# Patient Record
Sex: MALE | Race: WHITE | NOT HISPANIC OR LATINO | Employment: FULL TIME | URBAN - METROPOLITAN AREA
[De-identification: names, ages, dates, MRNs, and addresses within clinical notes are randomized per-mention and may not be internally consistent; named-entity substitution may affect disease eponyms.]

---

## 2023-06-22 ENCOUNTER — APPOINTMENT (EMERGENCY)
Dept: RADIOLOGY | Facility: HOSPITAL | Age: 43
DRG: 442 | End: 2023-06-22
Payer: OTHER MISCELLANEOUS

## 2023-06-22 ENCOUNTER — ANESTHESIA (EMERGENCY)
Dept: PERIOP | Facility: HOSPITAL | Age: 43
DRG: 442 | End: 2023-06-22
Payer: OTHER MISCELLANEOUS

## 2023-06-22 ENCOUNTER — HOSPITAL ENCOUNTER (INPATIENT)
Facility: HOSPITAL | Age: 43
LOS: 1 days | Discharge: ANOTHER HOSPITAL | DRG: 442 | End: 2023-06-22
Attending: SURGERY | Admitting: SURGERY
Payer: OTHER MISCELLANEOUS

## 2023-06-22 ENCOUNTER — APPOINTMENT (EMERGENCY)
Dept: CT IMAGING | Facility: HOSPITAL | Age: 43
DRG: 442 | End: 2023-06-22
Payer: OTHER MISCELLANEOUS

## 2023-06-22 ENCOUNTER — ANESTHESIA EVENT (EMERGENCY)
Dept: PERIOP | Facility: HOSPITAL | Age: 43
DRG: 442 | End: 2023-06-22
Payer: OTHER MISCELLANEOUS

## 2023-06-22 VITALS
WEIGHT: 166.89 LBS | OXYGEN SATURATION: 100 % | DIASTOLIC BLOOD PRESSURE: 85 MMHG | TEMPERATURE: 98 F | RESPIRATION RATE: 16 BRPM | SYSTOLIC BLOOD PRESSURE: 131 MMHG | HEART RATE: 101 BPM

## 2023-06-22 DIAGNOSIS — S11.91XA COMPLEX LACERATION OF NECK: Primary | ICD-10-CM

## 2023-06-22 DIAGNOSIS — S01.83XA: ICD-10-CM

## 2023-06-22 PROBLEM — S05.92XA LEFT EYE INJURY: Status: RESOLVED | Noted: 2023-06-22 | Resolved: 2023-06-22

## 2023-06-22 PROBLEM — S05.92XA LEFT EYE INJURY: Status: ACTIVE | Noted: 2023-06-22

## 2023-06-22 PROBLEM — S11.93XA PENETRATING TRAUMATIC INJURY OF NECK: Status: ACTIVE | Noted: 2023-06-22

## 2023-06-22 PROBLEM — S05.32XA: Status: ACTIVE | Noted: 2023-06-22

## 2023-06-22 PROBLEM — S01.81XA LACERATION OF FACE: Status: ACTIVE | Noted: 2023-06-22

## 2023-06-22 PROBLEM — S01.81XA LACERATION OF FACE: Status: RESOLVED | Noted: 2023-06-22 | Resolved: 2023-06-22

## 2023-06-22 LAB
ABO GROUP BLD: NORMAL
ANION GAP SERPL CALCULATED.3IONS-SCNC: 7 MMOL/L
ANISOCYTOSIS BLD QL SMEAR: PRESENT
APTT PPP: 24 SECONDS (ref 23–37)
BASE EXCESS BLDA CALC-SCNC: -1.4 MMOL/L
BASOPHILS # BLD MANUAL: 0 THOUSAND/UL (ref 0–0.1)
BASOPHILS NFR MAR MANUAL: 0 % (ref 0–1)
BLD GP AB SCN SERPL QL: POSITIVE
BLOOD GROUP ANTIBODIES SERPL: NORMAL
BUN SERPL-MCNC: 17 MG/DL (ref 5–25)
CALCIUM SERPL-MCNC: 7.7 MG/DL (ref 8.4–10.2)
CHLORIDE SERPL-SCNC: 108 MMOL/L (ref 96–108)
CO2 SERPL-SCNC: 24 MMOL/L (ref 21–32)
CREAT SERPL-MCNC: 0.73 MG/DL (ref 0.6–1.3)
E AG RBC QL: NEGATIVE
EOSINOPHIL # BLD MANUAL: 0 THOUSAND/UL (ref 0–0.4)
EOSINOPHIL NFR BLD MANUAL: 0 % (ref 0–6)
ERYTHROCYTE [DISTWIDTH] IN BLOOD BY AUTOMATED COUNT: 12.1 % (ref 11.6–15.1)
GFR SERPL CREATININE-BSD FRML MDRD: 113 ML/MIN/1.73SQ M
GLUCOSE SERPL-MCNC: 135 MG/DL (ref 65–140)
HCO3 BLDA-SCNC: 22.6 MMOL/L (ref 22–28)
HCT VFR BLD AUTO: 32.7 % (ref 36.5–49.3)
HGB BLD-MCNC: 11 G/DL (ref 12–17)
HOLD SPECIMEN: NORMAL
INR PPP: 1.12 (ref 0.84–1.19)
LACTATE SERPL-SCNC: 0.8 MMOL/L (ref 0.5–2)
LYMPHOCYTES # BLD AUTO: 0.4 THOUSAND/UL (ref 0.6–4.47)
LYMPHOCYTES # BLD AUTO: 3 % (ref 14–44)
MCH RBC QN AUTO: 30.5 PG (ref 26.8–34.3)
MCHC RBC AUTO-ENTMCNC: 33.6 G/DL (ref 31.4–37.4)
MCV RBC AUTO: 91 FL (ref 82–98)
MONOCYTES # BLD AUTO: 0.4 THOUSAND/UL (ref 0–1.22)
MONOCYTES NFR BLD: 3 % (ref 4–12)
NEUTROPHILS # BLD MANUAL: 12.51 THOUSAND/UL (ref 1.85–7.62)
NEUTS BAND NFR BLD MANUAL: 3 % (ref 0–8)
NEUTS SEG NFR BLD AUTO: 91 % (ref 43–75)
O2 CT BLDA-SCNC: 17.1 ML/DL (ref 16–23)
OXYHGB MFR BLDA: 98.5 % (ref 94–97)
PCO2 BLDA: 35.5 MM HG (ref 36–44)
PH BLDA: 7.42 [PH] (ref 7.35–7.45)
PLATELET # BLD AUTO: 182 THOUSANDS/UL (ref 149–390)
PLATELET BLD QL SMEAR: ADEQUATE
PMV BLD AUTO: 9.5 FL (ref 8.9–12.7)
PO2 BLDA: 179.3 MM HG (ref 75–129)
POTASSIUM SERPL-SCNC: 3.8 MMOL/L (ref 3.5–5.3)
PROTHROMBIN TIME: 14.6 SECONDS (ref 11.6–14.5)
RBC # BLD AUTO: 3.61 MILLION/UL (ref 3.88–5.62)
RBC MORPH BLD: PRESENT
RH BLD: POSITIVE
SODIUM SERPL-SCNC: 139 MMOL/L (ref 135–147)
SPECIMEN EXPIRATION DATE: NORMAL
WBC # BLD AUTO: 13.31 THOUSAND/UL (ref 4.31–10.16)

## 2023-06-22 PROCEDURE — 85610 PROTHROMBIN TIME: CPT

## 2023-06-22 PROCEDURE — 99284 EMERGENCY DEPT VISIT MOD MDM: CPT

## 2023-06-22 PROCEDURE — 82330 ASSAY OF CALCIUM: CPT

## 2023-06-22 PROCEDURE — 86870 RBC ANTIBODY IDENTIFICATION: CPT | Performed by: SURGERY

## 2023-06-22 PROCEDURE — 70498 CT ANGIOGRAPHY NECK: CPT

## 2023-06-22 PROCEDURE — 99292 CRITICAL CARE ADDL 30 MIN: CPT | Performed by: SURGERY

## 2023-06-22 PROCEDURE — 70496 CT ANGIOGRAPHY HEAD: CPT

## 2023-06-22 PROCEDURE — 85730 THROMBOPLASTIN TIME PARTIAL: CPT

## 2023-06-22 PROCEDURE — 85007 BL SMEAR W/DIFF WBC COUNT: CPT

## 2023-06-22 PROCEDURE — 93308 TTE F-UP OR LMTD: CPT | Performed by: PHYSICIAN ASSISTANT

## 2023-06-22 PROCEDURE — 94150 VITAL CAPACITY TEST: CPT

## 2023-06-22 PROCEDURE — 36415 COLL VENOUS BLD VENIPUNCTURE: CPT | Performed by: SURGERY

## 2023-06-22 PROCEDURE — 94002 VENT MGMT INPAT INIT DAY: CPT

## 2023-06-22 PROCEDURE — 99291 CRITICAL CARE FIRST HOUR: CPT | Performed by: SURGERY

## 2023-06-22 PROCEDURE — 12015 RPR F/E/E/N/L/M 7.6-12.5 CM: CPT | Performed by: SURGERY

## 2023-06-22 PROCEDURE — 12001 RPR S/N/AX/GEN/TRNK 2.5CM/<: CPT | Performed by: SURGERY

## 2023-06-22 PROCEDURE — 40650 RPR LIP FTH VERMILION ONLY: CPT | Performed by: SURGERY

## 2023-06-22 PROCEDURE — 86850 RBC ANTIBODY SCREEN: CPT | Performed by: SURGERY

## 2023-06-22 PROCEDURE — 20100 EXPL PENTRG WOUND NECK: CPT | Performed by: SURGERY

## 2023-06-22 PROCEDURE — 76705 ECHO EXAM OF ABDOMEN: CPT | Performed by: PHYSICIAN ASSISTANT

## 2023-06-22 PROCEDURE — 82947 ASSAY GLUCOSE BLOOD QUANT: CPT

## 2023-06-22 PROCEDURE — 0JQ40ZZ REPAIR RIGHT NECK SUBCUTANEOUS TISSUE AND FASCIA, OPEN APPROACH: ICD-10-PCS | Performed by: SURGERY

## 2023-06-22 PROCEDURE — 0JQ50ZZ REPAIR LEFT NECK SUBCUTANEOUS TISSUE AND FASCIA, OPEN APPROACH: ICD-10-PCS | Performed by: SURGERY

## 2023-06-22 PROCEDURE — 85014 HEMATOCRIT: CPT

## 2023-06-22 PROCEDURE — NC001 PR NO CHARGE

## 2023-06-22 PROCEDURE — 71045 X-RAY EXAM CHEST 1 VIEW: CPT

## 2023-06-22 PROCEDURE — 82803 BLOOD GASES ANY COMBINATION: CPT

## 2023-06-22 PROCEDURE — 84295 ASSAY OF SERUM SODIUM: CPT

## 2023-06-22 PROCEDURE — 94760 N-INVAS EAR/PLS OXIMETRY 1: CPT

## 2023-06-22 PROCEDURE — 0HQ1XZZ REPAIR FACE SKIN, EXTERNAL APPROACH: ICD-10-PCS | Performed by: SURGERY

## 2023-06-22 PROCEDURE — 0JQ10ZZ REPAIR FACE SUBCUTANEOUS TISSUE AND FASCIA, OPEN APPROACH: ICD-10-PCS | Performed by: SURGERY

## 2023-06-22 PROCEDURE — 83605 ASSAY OF LACTIC ACID: CPT

## 2023-06-22 PROCEDURE — NC001 PR NO CHARGE: Performed by: SURGERY

## 2023-06-22 PROCEDURE — 86900 BLOOD TYPING SEROLOGIC ABO: CPT | Performed by: SURGERY

## 2023-06-22 PROCEDURE — 82805 BLOOD GASES W/O2 SATURATION: CPT

## 2023-06-22 PROCEDURE — 86905 BLOOD TYPING RBC ANTIGENS: CPT

## 2023-06-22 PROCEDURE — 76604 US EXAM CHEST: CPT | Performed by: PHYSICIAN ASSISTANT

## 2023-06-22 PROCEDURE — 03LY0ZZ OCCLUSION OF UPPER ARTERY, OPEN APPROACH: ICD-10-PCS | Performed by: SURGERY

## 2023-06-22 PROCEDURE — NC001 PR NO CHARGE: Performed by: PHYSICIAN ASSISTANT

## 2023-06-22 PROCEDURE — 86901 BLOOD TYPING SEROLOGIC RH(D): CPT | Performed by: SURGERY

## 2023-06-22 PROCEDURE — 85027 COMPLETE CBC AUTOMATED: CPT

## 2023-06-22 PROCEDURE — 84132 ASSAY OF SERUM POTASSIUM: CPT

## 2023-06-22 PROCEDURE — 80048 BASIC METABOLIC PNL TOTAL CA: CPT

## 2023-06-22 RX ORDER — FENTANYL CITRATE 50 UG/ML
INJECTION, SOLUTION INTRAMUSCULAR; INTRAVENOUS AS NEEDED
Status: DISCONTINUED | OUTPATIENT
Start: 2023-06-22 | End: 2023-06-22

## 2023-06-22 RX ORDER — HYDROMORPHONE HCL/PF 1 MG/ML
SYRINGE (ML) INJECTION AS NEEDED
Status: DISCONTINUED | OUTPATIENT
Start: 2023-06-22 | End: 2023-06-22

## 2023-06-22 RX ORDER — CEFAZOLIN SODIUM 1 G/3ML
INJECTION, POWDER, FOR SOLUTION INTRAMUSCULAR; INTRAVENOUS AS NEEDED
Status: DISCONTINUED | OUTPATIENT
Start: 2023-06-22 | End: 2023-06-22

## 2023-06-22 RX ORDER — SODIUM CHLORIDE 9 MG/ML
INJECTION, SOLUTION INTRAVENOUS CONTINUOUS PRN
Status: DISCONTINUED | OUTPATIENT
Start: 2023-06-22 | End: 2023-06-22

## 2023-06-22 RX ORDER — FENTANYL CITRATE-0.9 % NACL/PF 10 MCG/ML
125 PLASTIC BAG, INJECTION (ML) INTRAVENOUS CONTINUOUS
Status: DISCONTINUED | OUTPATIENT
Start: 2023-06-22 | End: 2023-06-22 | Stop reason: HOSPADM

## 2023-06-22 RX ORDER — DEXAMETHASONE SODIUM PHOSPHATE 10 MG/ML
INJECTION, SOLUTION INTRAMUSCULAR; INTRAVENOUS AS NEEDED
Status: DISCONTINUED | OUTPATIENT
Start: 2023-06-22 | End: 2023-06-22

## 2023-06-22 RX ORDER — MIDAZOLAM HYDROCHLORIDE 2 MG/2ML
2 INJECTION, SOLUTION INTRAMUSCULAR; INTRAVENOUS EVERY 4 HOURS PRN
Status: DISCONTINUED | OUTPATIENT
Start: 2023-06-22 | End: 2023-06-22 | Stop reason: HOSPADM

## 2023-06-22 RX ORDER — PROPOFOL 10 MG/ML
INJECTION, EMULSION INTRAVENOUS AS NEEDED
Status: DISCONTINUED | OUTPATIENT
Start: 2023-06-22 | End: 2023-06-22

## 2023-06-22 RX ORDER — SODIUM CHLORIDE, SODIUM GLUCONATE, SODIUM ACETATE, POTASSIUM CHLORIDE, MAGNESIUM CHLORIDE, SODIUM PHOSPHATE, DIBASIC, AND POTASSIUM PHOSPHATE .53; .5; .37; .037; .03; .012; .00082 G/100ML; G/100ML; G/100ML; G/100ML; G/100ML; G/100ML; G/100ML
125 INJECTION, SOLUTION INTRAVENOUS CONTINUOUS
Status: DISCONTINUED | OUTPATIENT
Start: 2023-06-22 | End: 2023-06-22 | Stop reason: HOSPADM

## 2023-06-22 RX ORDER — HYDROMORPHONE HYDROCHLORIDE 1 MG/ML
INJECTION, SOLUTION INTRAMUSCULAR; INTRAVENOUS; SUBCUTANEOUS AS NEEDED
Status: DISCONTINUED | OUTPATIENT
Start: 2023-06-22 | End: 2023-06-22

## 2023-06-22 RX ORDER — CHLORHEXIDINE GLUCONATE 0.12 MG/ML
15 RINSE ORAL EVERY 12 HOURS SCHEDULED
Status: DISCONTINUED | OUTPATIENT
Start: 2023-06-22 | End: 2023-06-22 | Stop reason: HOSPADM

## 2023-06-22 RX ORDER — SODIUM CHLORIDE, SODIUM LACTATE, POTASSIUM CHLORIDE, CALCIUM CHLORIDE 600; 310; 30; 20 MG/100ML; MG/100ML; MG/100ML; MG/100ML
INJECTION, SOLUTION INTRAVENOUS CONTINUOUS PRN
Status: DISCONTINUED | OUTPATIENT
Start: 2023-06-22 | End: 2023-06-22

## 2023-06-22 RX ORDER — SUCCINYLCHOLINE/SOD CL,ISO/PF 100 MG/5ML
SYRINGE (ML) INTRAVENOUS AS NEEDED
Status: DISCONTINUED | OUTPATIENT
Start: 2023-06-22 | End: 2023-06-22

## 2023-06-22 RX ORDER — FENTANYL CITRATE 50 UG/ML
50 INJECTION, SOLUTION INTRAMUSCULAR; INTRAVENOUS ONCE
Status: COMPLETED | OUTPATIENT
Start: 2023-06-22 | End: 2023-06-22

## 2023-06-22 RX ORDER — MIDAZOLAM HYDROCHLORIDE 2 MG/2ML
INJECTION, SOLUTION INTRAMUSCULAR; INTRAVENOUS AS NEEDED
Status: DISCONTINUED | OUTPATIENT
Start: 2023-06-22 | End: 2023-06-22

## 2023-06-22 RX ORDER — MAGNESIUM HYDROXIDE 1200 MG/15ML
LIQUID ORAL AS NEEDED
Status: DISCONTINUED | OUTPATIENT
Start: 2023-06-22 | End: 2023-06-22 | Stop reason: HOSPADM

## 2023-06-22 RX ORDER — ALBUMIN, HUMAN INJ 5% 5 %
SOLUTION INTRAVENOUS CONTINUOUS PRN
Status: DISCONTINUED | OUTPATIENT
Start: 2023-06-22 | End: 2023-06-22

## 2023-06-22 RX ORDER — ROCURONIUM BROMIDE 10 MG/ML
INJECTION, SOLUTION INTRAVENOUS AS NEEDED
Status: DISCONTINUED | OUTPATIENT
Start: 2023-06-22 | End: 2023-06-22

## 2023-06-22 RX ORDER — PROPOFOL 10 MG/ML
5-70 INJECTION, EMULSION INTRAVENOUS
Status: DISCONTINUED | OUTPATIENT
Start: 2023-06-22 | End: 2023-06-22 | Stop reason: HOSPADM

## 2023-06-22 RX ADMIN — FENTANYL CITRATE 25 MCG: 50 INJECTION INTRAMUSCULAR; INTRAVENOUS at 15:05

## 2023-06-22 RX ADMIN — CEFAZOLIN 1000 MG: 1 INJECTION, POWDER, FOR SOLUTION INTRAMUSCULAR; INTRAVENOUS at 11:48

## 2023-06-22 RX ADMIN — FENTANYL CITRATE 50 MCG: 50 INJECTION INTRAMUSCULAR; INTRAVENOUS at 12:11

## 2023-06-22 RX ADMIN — Medication 100 MG: at 11:46

## 2023-06-22 RX ADMIN — HYDROMORPHONE HYDROCHLORIDE 0.5 MG: 1 INJECTION, SOLUTION INTRAMUSCULAR; INTRAVENOUS; SUBCUTANEOUS at 13:54

## 2023-06-22 RX ADMIN — SODIUM CHLORIDE: 0.9 INJECTION, SOLUTION INTRAVENOUS at 11:47

## 2023-06-22 RX ADMIN — PROPOFOL 70 MCG/KG/MIN: 10 INJECTION, EMULSION INTRAVENOUS at 16:04

## 2023-06-22 RX ADMIN — FENTANYL CITRATE 50 MCG: 50 INJECTION INTRAMUSCULAR; INTRAVENOUS at 15:52

## 2023-06-22 RX ADMIN — Medication 125 MCG/HR: at 16:21

## 2023-06-22 RX ADMIN — ROCURONIUM BROMIDE 50 MG: 50 INJECTION INTRAVENOUS at 12:05

## 2023-06-22 RX ADMIN — ALBUMIN (HUMAN): 12.5 INJECTION, SOLUTION INTRAVENOUS at 12:34

## 2023-06-22 RX ADMIN — SODIUM CHLORIDE, SODIUM GLUCONATE, SODIUM ACETATE, POTASSIUM CHLORIDE, MAGNESIUM CHLORIDE, SODIUM PHOSPHATE, DIBASIC, AND POTASSIUM PHOSPHATE 125 ML/HR: .53; .5; .37; .037; .03; .012; .00082 INJECTION, SOLUTION INTRAVENOUS at 16:17

## 2023-06-22 RX ADMIN — ROCURONIUM BROMIDE 10 MG: 50 INJECTION INTRAVENOUS at 13:56

## 2023-06-22 RX ADMIN — MIDAZOLAM 2 MG: 1 INJECTION INTRAMUSCULAR; INTRAVENOUS at 15:54

## 2023-06-22 RX ADMIN — IOHEXOL 85 ML: 350 INJECTION, SOLUTION INTRAVENOUS at 11:23

## 2023-06-22 RX ADMIN — FENTANYL CITRATE 50 MCG: 50 INJECTION INTRAMUSCULAR; INTRAVENOUS at 11:45

## 2023-06-22 RX ADMIN — ROCURONIUM BROMIDE 30 MG: 50 INJECTION INTRAVENOUS at 13:26

## 2023-06-22 RX ADMIN — SODIUM CHLORIDE, SODIUM LACTATE, POTASSIUM CHLORIDE, AND CALCIUM CHLORIDE: .6; .31; .03; .02 INJECTION, SOLUTION INTRAVENOUS at 14:01

## 2023-06-22 RX ADMIN — PROPOFOL 200 MG: 10 INJECTION, EMULSION INTRAVENOUS at 11:46

## 2023-06-22 RX ADMIN — FENTANYL CITRATE 25 MCG: 50 INJECTION INTRAMUSCULAR; INTRAVENOUS at 15:10

## 2023-06-22 RX ADMIN — SODIUM CHLORIDE: 0.9 INJECTION, SOLUTION INTRAVENOUS at 13:53

## 2023-06-22 RX ADMIN — SODIUM CHLORIDE, SODIUM LACTATE, POTASSIUM CHLORIDE, AND CALCIUM CHLORIDE: .6; .31; .03; .02 INJECTION, SOLUTION INTRAVENOUS at 11:42

## 2023-06-22 RX ADMIN — DEXAMETHASONE SODIUM PHOSPHATE 10 MG: 10 INJECTION, SOLUTION INTRAMUSCULAR; INTRAVENOUS at 12:30

## 2023-06-22 RX ADMIN — CEFAZOLIN 1000 MG: 1 INJECTION, POWDER, FOR SOLUTION INTRAMUSCULAR; INTRAVENOUS at 11:53

## 2023-06-22 RX ADMIN — MIDAZOLAM HYDROCHLORIDE 2 MG: 1 INJECTION, SOLUTION INTRAMUSCULAR; INTRAVENOUS at 11:45

## 2023-06-22 RX ADMIN — FENTANYL CITRATE 50 MCG: 50 INJECTION INTRAMUSCULAR; INTRAVENOUS at 14:29

## 2023-06-22 RX ADMIN — ALBUMIN (HUMAN): 12.5 INJECTION, SOLUTION INTRAVENOUS at 13:25

## 2023-06-22 NOTE — ASSESSMENT & PLAN NOTE
- Penetrating injury to the anterior neck and left lateral neck from    - No definitive evidence of vascular, airway or oral digestive track during initial trauma evaluation and CT imaging  - OR for neck exploration and interventions as indicated  - Airway monitoring    - Patient likely to remain intubated postoperatively for transfer to outside hospital for further evaluation and management of left eye injury   - Local wound care as indicated  - Analgesia as needed  - Update tetanus vaccine status

## 2023-06-22 NOTE — ANESTHESIA PROCEDURE NOTES
Arterial Line Insertion    Performed by: Terry Maciel CRNA  Authorized by: Kartik Castanon MD  Consent: Verbal consent obtained  Written consent obtained  Risks and benefits: risks, benefits and alternatives were discussed  Consent given by: patient  Patient understanding: patient states understanding of the procedure being performed  Patient consent: the patient's understanding of the procedure matches consent given  Procedure consent: procedure consent matches procedure scheduled  Relevant documents: relevant documents present and verified  Test results: test results available and properly labeled  Patient identity confirmed: arm band and hospital-assigned identification number  Preparation: Patient was prepped and draped in the usual sterile fashion    Indications: hemodynamic monitoring  Orientation:  Right  Location: radial artery  Procedure Details:  Paco's test normal: yes  Needle gauge: 20  Number of attempts: 2    Post-procedure:  Post-procedure: dressing applied  Waveform: good waveform and waveform confirmed  Post-procedure CNS: normal  Patient tolerance: patient tolerated the procedure well with no immediate complications  Comments: Ultrasound guided

## 2023-06-22 NOTE — H&P
Trauma Alert: Level A   Model of Arrival: Ambulance    Trauma Team: Attending Carmella Hall and CHRISTINE Rosado  Consultants: {[facility] Trauma Consultants:[id number]}     History of Present Illness     Chief Complaint:  injury  Mechanism:Other:  injury     HPI:    Shaylee Alfredo is a 36 y o  male who [facility] after sustaining a fall off of a roof with a power washing injury  When patient slipped,  at 4000 PSI hit patient in the face and neck, causing injuries that were quickly bandaged prior to EMS arrival  On arrival to [place] [address], with blood  Lacerations to left eye and left lower eyelid, mandible laceration going up to upper lip, and laceration of left lower neck  Plan for CTA head and neck, OR exploration of left neck laceration, and possible transfer if left globe ruptured     Review of Systems   Constitutional: Negative  HENT: Negative  Eyes: Negative  Respiratory: Negative for shortness of breath  Cardiovascular: Negative for chest pain  Gastrointestinal: Negative for diarrhea, nausea and vomiting  Endocrine: Negative  Genitourinary: Negative  Musculoskeletal: Positive for neck pain  Left   Allergic/Immunologic: Negative  Neurological: Negative  Hematological: Negative  Psychiatric/Behavioral: Negative  12-point, complete review of systems was reviewed and negative except as stated above  Historical Information     No past medical history on file  No past surgical history on file  Unable to obtain history due to {Reasons why history is unobtainable:20919}         There is no immunization history on file for this patient  Last Tetanus: ***  Family History: Non-contributory     Meds/Allergies   all current active meds have been reviewed  Allergies have not been reviewed;   Not on File    Objective   Initial Vitals:   Temperature: 97 7 °F (36 5 °C) (06/22/23 1105)  Pulse: 77 (06/22/23 1105)  Respirations: 20 (06/22/23 1105)  Blood Pressure: 139/87 (06/22/23 1105)    Primary Survey:   Airway:        Status: patent;        Pre-hospital Interventions: none        Hospital Interventions: none  Breathing:        Pre-hospital Interventions: none       Effort: normal       Right breath sounds: normal       Left breath sounds: normal  Circulation:        Rhythm: regular       Rate: regular   Right Pulses Left Pulses      R femoral: 2+    R carotid: 2+     L femoral: 2+    L carotid: 2+     Disability:        GCS: Eye: 4; Verbal: 5 Motor: 6 Total: 15       Right Pupil:       Left Pupil:     R Motor Strength L Motor Strength    R : 5/5  R dorsiflex: 5/5  R plantarflex: 5/5 L : 5/5  L dorsiflex: 5/5  L plantarflex: 5/5        Sensory:  No sensory deficit  Exposure:           Secondary Survey:  Physical Exam  Vitals and nursing note reviewed  HENT:      Head:        Right Ear: External ear normal       Left Ear: External ear normal       Nose: Nose normal       Mouth/Throat:      Mouth: Mucous membranes are dry  Pharynx: Oropharynx is clear  Eyes:     Cardiovascular:      Rate and Rhythm: Normal rate and regular rhythm  Pulses: Normal pulses  Carotid pulses are 2+ on the right side and 2+ on the left side  Radial pulses are 2+ on the right side and 2+ on the left side  Heart sounds: S1 normal and S2 normal  No murmur heard  Pulmonary:      Effort: Pulmonary effort is normal  No respiratory distress  Breath sounds: Normal breath sounds  Abdominal:      General: Abdomen is flat  Bowel sounds are normal       Palpations: Abdomen is soft  Skin:     General: Skin is warm and dry  Capillary Refill: Capillary refill takes less than 2 seconds  Neurological:      Mental Status: He is alert and oriented to person, place, and time  GCS: GCS eye subscore is 4  GCS verbal subscore is 5  GCS motor subscore is 6         Invasive Devices     Peripheral Intravenous Line  Duration "Peripheral IV 06/22/23 Left Antecubital <1 day    Peripheral IV 06/22/23 Left;Dorsal (posterior) Hand <1 day              Lab Results: Results: I have personally reviewed all pertinent laboratory/tests results, BMP/CMP: No results found for: \"SODIUM\", \"K\", \"CL\", \"CO2\", \"ANIONGAP\", \"BUN\", \"CREATININE\", \"GLUCOSE\", \"CALCIUM\", \"AST\", \"ALT\", \"ALKPHOS\", \"PROT\", \"BILITOT\", \"EGFR\", CBC: No results found for: \"WBC\", \"HGB\", \"HCT\", \"MCV\", \"PLT\", \"ADJUSTEDWBC\", \"RBC\", \"MCH\", \"MCHC\", \"RDW\", \"MPV\", \"NRBC\", Coagulation: No results found for: \"PT\", \"INR\", \"APTT\", Lactate: No results found for: \"LACTATE\", Amylase: No results found for: \"AMYLASE\", Lipase: No results found for: \"LIPASE\", AST: No results found for: \"AST\", ALT: No results found for: \"ALT\", Urinalysis: No results found for: \"COLORU\", \"CLARITYU\", \"SPECGRAV\", \"PHUR\", \"LEUKOCYTESUR\", \"NITRITE\", \"PROTEINUA\", \"GLUCOSEU\", \"KETONESU\", \"BILIRUBINUR\", \"BLOODU\", CK: No results found for: \"CKTOTAL\", Troponin: No results found for: \"TROPONINI\", EtOH: No results found for: \"ETOH\", UDS: No components found for: \"RAPIDDRUGSCREEN\", ABG: No results found for: \"PHART\", \"DTC1DOO\", \"PO2ART\", \"NLB5HQR\", \"F6HGLPQZ\", \"BEART\", \"SOURCE\" and ISTAT: No components found for: \"VBG\"    Imaging Results: I have personally reviewed pertinent reports  Chest Xray(s): negative for acute findings   FAST exam(s): negative for acute findings   CT Scan(s): {Results:17019}   Additional Xray(s): N/A     Other Studies: ***     Code Status: No Order  Advance Directive and Living Will:      Power of :    POLST:    I have spent *** minutes with Patient and family today in which greater than 50% of this time was spent in counseling/coordination of care regarding Diagnostic results, Prognosis, Risks and benefits of tx options, Instructions for management and Risk factor reductions       "

## 2023-06-22 NOTE — ASSESSMENT & PLAN NOTE
- Laceration of the left eye with globe rupture evident on clinical evaluation as well as CT imaging   - Ophthalmology contacted and will apply eye shield with plan to transfer to [facility] for further work-up and management  - Patient with clear and serosanguineous fluid draining from left eye    - Patient without any visual acuity in the left eye on presentation

## 2023-06-22 NOTE — PROGRESS NOTES
I spoke with Dr Magdaleno Perez of the trauma service at [facility] who is the accepting physician for this transfer  The patient will be flown from [place] [address] to [facility] following his operative intervention and remain intubated for transfer  He will be transferred to an ICU bed  Additionally, I spoke with Dr Vira Pelaez of Ophthalmology to discuss his left eye injury and they were also agreeable to transfer for further evaluation and management  Currently, the transfer center is working on bed availability and transportation

## 2023-06-22 NOTE — ANESTHESIA POSTPROCEDURE EVALUATION
Post-Op Assessment Note    CV Status:  Stable  Pain scale: Unable to assess  Intubatad & sedated  Pain control: Unable to assess  Intubatad & sedated  Post-procedure mental status: Unable to assess  Intubatad & sedated  Hydration Status:  Hypovolemic   PONV Controlled:  Controlled   Airway Patency:  Patent  Airway: intubated      Post Op Vitals Reviewed: Yes      Staff: CRNA   Comments: Remained intubated & sedated  Tolerating current vent setttings  No notable events documented      ABP  115/69 (without pressors)    Temp      Pulse  98   Resp 18   SpO2 100% via ventilator

## 2023-06-22 NOTE — PROCEDURES
POC FAST     Date/Time: 6/22/2023 11:14 AM    Performed by: Jojo Chinchilla PA-C  Authorized by:  Jojo Chinchilla PA-C    Patient location:  Trauma  Procedure details:     Exam Type:  Diagnostic    Indications: blunt abdominal trauma and blunt chest trauma      Assess for:  Intra-abdominal fluid, pericardial effusion and pneumothorax    Technique: extended FAST      Views obtained:  Heart - Pericardial sac, LUQ - Splenorenal space, Suprapubic - Pouch of Asif, RUQ - Sutherland's Pouch, Left thorax and Right thorax    Image quality: diagnostic      Image availability:  Images available in PACS and video obtained  FAST Findings:     RUQ (Hepatorenal) free fluid: absent      LUQ (Splenorenal) free fluid: absent      Suprapubic free fluid: absent      Cardiac wall motion: identified      Pericardial effusion: absent    extended FAST (Pulmonary) findings:     Left lung sliding: Present      Right lung sliding: Present    Interpretation:     Impressions: negative

## 2023-06-22 NOTE — H&P
[facility]  [facility]  Name: Kemi Howard 36 y o  male I MRN: [id number]  Unit/Bed#: OR [place] I Date of Admission: 6/22/2023   Date of Service: 6/22/2023 I Hospital Day: 0      Assessment/Plan   * Penetrating traumatic injury of neck  Assessment & Plan  - Penetrating injury to the anterior neck and left lateral neck from    - No definitive evidence of vascular, airway or oral digestive track during initial trauma evaluation and CT imaging  - OR for neck exploration and interventions as indicated  - Airway monitoring    - Patient likely to remain intubated postoperatively for transfer to outside hospital for further evaluation and management of left eye injury   - Local wound care as indicated  - Analgesia as needed  - Update tetanus vaccine status  Complex laceration of neck-resolved as of 6/22/2023  Assessment & Plan  - Complex neck laceration from  injury   - Management as noted under penetrating traumatic neck injury  Penetrating traumatic injury of face  Assessment & Plan  - Penetrating injury to the face and left thigh from    - Clinical evaluation and CT imaging consistent with left eye injury including globe rupture  - Patient with clear and serosanguineous fluid draining from left eye    - Patient without any visual acuity in the left eye on presentation  - OR for neck exploration and interventions as indicated as well as facial wound washout and management  - Airway monitoring    - Patient likely to remain intubated postoperatively for transfer to outside hospital for further evaluation and management of left eye injury   - Local wound care as indicated  - Analgesia as needed  - Update tetanus vaccine status  Laceration of face-resolved as of 6/22/2023  Assessment & Plan  - Facial lacerations, present on presentation   - Management of these injuries as outlined under penetrating traumatic injury of the face      Laceration "of left eye with rupture  Assessment & Plan  - Laceration of the left eye with globe rupture evident on clinical evaluation as well as CT imaging   - Ophthalmology contacted and will apply eye shield with plan to transfer to [facility] for further work-up and management  - Patient with clear and serosanguineous fluid draining from left eye    - Patient without any visual acuity in the left eye on presentation  Trauma Alert: Level A   Model of Arrival: Ambulance    Trauma Team: Attending Dr Micaela Parkinson and CHRISTINE Murillo PA-C  Consultants: Ophthalmology contacted via [facility] connect at 11:24 AM with response by 11:25 AM    History of Present Illness     Chief Complaint: \"My neck hurts  \"  Mechanism:Other:  injury to the head/face and neck    HPI:    Shaylee Alfredo is a 36 y o  male who presents with left eye, face, and neck injuries from a   He was reported to have potentially fallen off of a roof while power washing and suffered injuries to his face and neck per EMS  They were quickly bandaged by others on scene prior to EMS arrival with no airway or bleeding concerns during transport per EMS  In the trauma bay, the patient complained of facial and neck pain, but denied any shortness of breath, difficulty breathing or swallowing  He did note left eye pain as well and was unable to see or open his left eye during his initial evaluation  Review of Systems   Constitutional: Negative  Negative for activity change, appetite change, chills, fatigue and fever  HENT: Positive for facial swelling  Negative for ear pain, nosebleeds and voice change  Facial and left eye pain   Eyes: Positive for pain (Left eye pain) and visual disturbance (Decreased vision in left eye)  Negative for photophobia and redness  Respiratory: Negative  Negative for cough, chest tightness, shortness of breath and wheezing  Cardiovascular: Negative    Negative for chest pain, palpitations and " leg swelling  Gastrointestinal: Negative  Negative for abdominal distention, abdominal pain, nausea and vomiting  Endocrine: Negative  Genitourinary: Negative  Negative for dysuria, flank pain, frequency and hematuria  Musculoskeletal: Positive for neck pain (Frontal neck pain)  Negative for arthralgias, back pain and myalgias  Skin: Positive for wound (Lacerations to face and neck)  Negative for color change, pallor and rash  Allergic/Immunologic: Negative  Neurological: Negative  Negative for dizziness, syncope, weakness, light-headedness, numbness and headaches  Hematological: Negative  Psychiatric/Behavioral: Negative for agitation, confusion, self-injury and sleep disturbance  The patient is nervous/anxious  12-point, complete review of systems was reviewed and negative except as stated above  Historical Information     History reviewed  No pertinent past medical history  History reviewed  No pertinent surgical history  There is no immunization history for the selected administration types on file for this patient    Last Tetanus: Unknown  Family History: Non-contributory     Meds/Allergies   all current active meds have been reviewed   No Known Allergies    Objective   Initial Vitals:   Temperature: 97 7 °F (36 5 °C) (06/22/23 1105)  Pulse: 77 (06/22/23 1105)  Respirations: 20 (06/22/23 1105)  Blood Pressure: 139/87 (06/22/23 1105)    Primary Survey:   Airway:        Status: patent;        Pre-hospital Interventions: none        Hospital Interventions: none  Breathing:        Pre-hospital Interventions: none       Effort: normal       Right breath sounds: normal       Left breath sounds: normal  Circulation:        Rhythm: regular       Rate: regular   Right Pulses Left Pulses    R radial: 2+  R femoral: 2+  R pedal: 2+  R carotid: 2+  R popliteal: 2+ L radial: 2+  L femoral: 2+  L pedal: 2+  L carotid: 2+  L popliteal: 2+   Disability:        GCS: Eye: 4; Verbal: 5 Motor: 6 Total: 15       Right Pupil: round;  reactive         Left Pupil:  not round;  not reactive      R Motor Strength L Motor Strength    R : 5/5  R dorsiflex: 5/5  R plantarflex: 5/5 L : 5/5  L dorsiflex: 5/5  L plantarflex: 5/5        Sensory:  No sensory deficit  Exposure:       Completed: Yes      Secondary Survey:  Physical Exam  Vitals and nursing note reviewed  Exam conducted with a chaperone present  Constitutional:       General: He is awake  He is in acute distress (Acute distress secondary to trauma and pain)  Appearance: Normal appearance  He is normal weight  He is not ill-appearing, toxic-appearing or diaphoretic  Interventions: He is not intubated  HENT:      Head: Normocephalic  Laceration (Multiple lacerations to the face including long complex laceration spanning the submental chin through the lower and upper left lip and extending up to the left lower eyelid without active bleeding) present  No contusion  Jaw: There is normal jaw occlusion  Right Ear: Hearing and external ear normal  No swelling or tenderness  Left Ear: Hearing and external ear normal  No swelling or tenderness  Nose: Nose normal  No nasal deformity, septal deviation, signs of injury, laceration or nasal tenderness  Mouth/Throat:      Mouth: Mucous membranes are moist       Pharynx: Oropharynx is clear  Eyes:      Extraocular Movements: Extraocular movements intact  Comments: EOMs in both eyes appear grossly intact  Right eye visual acuity grossly intact  Obvious trauma to the left lower eyelid and left eye including injury to the cornea, iris and abnormal appearing pupil with no visual acuity or sensitivity to light  Clear fluid draining from left eye wound  Cardiovascular:      Rate and Rhythm: Normal rate and regular rhythm  Pulses:           Carotid pulses are 2+ on the right side and 2+ on the left side         Radial pulses are 2+ on the right side and 2+ on the left side  Femoral pulses are 2+ on the right side and 2+ on the left side  Dorsalis pedis pulses are 2+ on the right side and 2+ on the left side  Heart sounds: Normal heart sounds  No murmur heard  No friction rub  No gallop  Pulmonary:      Effort: Pulmonary effort is normal  No tachypnea, bradypnea, accessory muscle usage, prolonged expiration, respiratory distress or retractions  He is not intubated  Breath sounds: Normal breath sounds and air entry  No stridor or decreased air movement  No decreased breath sounds, wheezing, rhonchi or rales  Chest:      Chest wall: No lacerations, deformity, swelling, tenderness or crepitus  Abdominal:      General: Abdomen is flat  Bowel sounds are normal  There is no distension  Palpations: Abdomen is soft  Tenderness: There is no abdominal tenderness  There is no guarding or rebound  Musculoskeletal:         General: No swelling, tenderness or deformity  Normal range of motion  Cervical back: Normal range of motion and neck supple  Signs of trauma (Multiple complex lacerations to the right anterior lateral, central anterior, and left anterior lateral neck without active bleeding) present  No swelling, deformity, lacerations or tenderness  No spinous process tenderness or muscular tenderness  Thoracic back: No swelling, deformity, signs of trauma, lacerations or tenderness  Lumbar back: No swelling, deformity, signs of trauma, lacerations or tenderness  Right lower leg: No edema  Left lower leg: No edema  Comments: No midline cervical, thoracic or lumbar spine tenderness, step-offs or deformities  No paraspinal muscular tenderness in the neck or back  Normal range of motion in all 4 extremities without pain, tenderness or deformity  Skin:     General: Skin is warm and dry  Capillary Refill: Capillary refill takes less than 2 seconds        Coloration: Skin is not jaundiced or "pale       Findings: Laceration (Multiple facial and neck lacerations) present  No abrasion, bruising, ecchymosis, erythema, lesion or rash  Neurological:      General: No focal deficit present  Mental Status: He is alert and oriented to person, place, and time  Mental status is at baseline  GCS: GCS eye subscore is 4  GCS verbal subscore is 5  GCS motor subscore is 6  Sensory: Sensation is intact  No sensory deficit  Motor: Motor function is intact  No weakness  Psychiatric:         Mood and Affect: Mood normal          Behavior: Behavior is cooperative  Invasive Devices     Peripheral Intravenous Line  Duration           Peripheral IV 06/22/23 Left Antecubital <1 day    Peripheral IV 06/22/23 Left;Dorsal (posterior) Hand <1 day          Arterial Line  Duration           Arterial Line 06/22/23 Right Radial <1 day          Drain  Duration           Urethral Catheter Temperature probe;Non-latex; Double-lumen 16 Fr  <1 day          Airway  Duration           ETT  Oral;Straight; Inflated; Hi-Lo; Cuffed 8 mm <1 day              Lab Results: Results: I have personally reviewed all pertinent laboratory/tests results, BMP/CMP: No results found for: \"SODIUM\", \"K\", \"CL\", \"CO2\", \"ANIONGAP\", \"BUN\", \"CREATININE\", \"GLUCOSE\", \"CALCIUM\", \"AST\", \"ALT\", \"ALKPHOS\", \"PROT\", \"BILITOT\", \"EGFR\", CBC: No results found for: \"WBC\", \"HGB\", \"HCT\", \"MCV\", \"PLT\", \"ADJUSTEDWBC\", \"RBC\", \"MCH\", \"MCHC\", \"RDW\", \"MPV\", \"NRBC\" and Coagulation: No results found for: \"PT\", \"INR\", \"APTT\"    Imaging Results: I have personally reviewed pertinent reports     and I have personally reviewed pertinent films in PACS  Chest Xray(s): negative for acute findings   FAST exam(s): negative for acute findings   CT Scan(s): positive for acute findings: Left orbital trauma with injury to the left globe demonstrating decreased volume and underlying hemorrhage with gas surrounding the left globe and within the preseptal and preorbital regions " without evidence of retrobulbar or hematoma  No evidence for large vessel intracranial occlusion or hemodynamically significant stenosis  Prominent laceration injuries involving the submental region and overlying the thyroid cartilage with associated subcutaneous emphysema; tracheal contour is preserved without suspicion for injury; additional lacerations along the bilateral anterior cervical regions with extensive left greater than right cervical subcutaneous emphysema extending along the sternocleidomastoid muscles, left carotid space and retropharyngeal space  No suspicious vascular injury identified on CTA imaging  Additional Xray(s): N/A     Other Studies: N/A    Code Status: No Order  Advance Directive and Living Will:      Power of :    POLST:    I have spent 45 minutes with Patient and family today in which greater than 50% of this time was spent in counseling/coordination of care regarding Diagnostic results, Instructions for management, Patient and family education, Impressions, Counseling / Coordination of care, Documenting in the medical record, Reviewing / ordering tests, medicine, procedures  , Obtaining or reviewing history   and Communicating with other healthcare professionals

## 2023-06-22 NOTE — ASSESSMENT & PLAN NOTE
-Laceration of the left eye with globe rupture evident on clinical evaluation as well as CT imaging   - Ophthalmology contacted and will apply eye shield with plan to transfer to [facility] for further work-up and management  - Patient with clear and serosanguineous fluid draining from left eye           - Patient without any visual acuity in the left eye on presentation    Plan to transfer to [facility]  AT [place] for opthalomology and emergent intervention

## 2023-06-22 NOTE — CASE MANAGEMENT
CM responded to trauma alert  Patient was transported into trauma bay by [facility]  Patient alert and responsive to medical team's questions and instructions  CM located patients wife's contact info from patients phone- wifeAngel; [phone]  Call made to wife, general update provided  Trauma attending able to speak with wife also and give further information  No current identified CM needs  CM will follow and update screening, assessment, and discharge planning as appropriate

## 2023-06-22 NOTE — CONSULTS
[facility]  Critical Care Acceptance  Name: Davis Castro 36 y o  male I MRN: [id number]  Unit/Bed#: ICU 15 I Date of Admission: 6/22/2023   Date of Service: 6/22/2023 I Hospital Day: 0    Assessment/Plan   Penetrating traumatic injury of face  Assessment & Plan  Penetrating injury to the face and left thigh from    - Clinical evaluation and CT imaging consistent with left eye injury including globe rupture  - Patient with clear and serosanguineous fluid draining from left eye           - Patient without any visual acuity in the left eye on presentation  - OR for neck exploration and interventions as indicated as well as facial wound washout and management  - Airway monitoring           - Patient likely to remain intubated postoperatively for transfer to outside hospital for further evaluation and management of left eye injury   - Local wound care as indicated  - Analgesia as needed  - Update tetanus vaccine status  S/p washout and suturing, continue site exams    Laceration of left eye with rupture  Assessment & Plan  -Laceration of the left eye with globe rupture evident on clinical evaluation as well as CT imaging   - Ophthalmology contacted and will apply eye shield with plan to transfer to [facility] for further work-up and management  - Patient with clear and serosanguineous fluid draining from left eye           - Patient without any visual acuity in the left eye on presentation  Plan to transfer to [facility]  AT [place] for opthalomology and emergent intervention    * Penetrating traumatic injury of neck  Assessment & Plan  -Penetrating injury to the anterior neck and left lateral neck from    - No definitive evidence of vascular, airway or oral digestive track during initial trauma evaluation and CT imaging  - OR for neck exploration and interventions as indicated    - Airway monitoring           - Patient likely to remain intubated postoperatively for transfer to outside hospital for further evaluation and management of left eye injury   - Local wound care as indicated  - Analgesia as needed  - Update tetanus vaccine status  S/p surgery with washout, HANNAH drain in place  Follow up drainage      History of Present Illness     HPI: Arnel Barker is a 36 y o  who presented to [address] [place] [address] ED 6/22 after sustaining a fall off of a roof with a power washing injury  When patient slipped,  at 4000 PSI hit patient in the face and neck, causing injuries that were quickly bandaged prior to EMS arrival  On arrival to [place] [address], with blood  Lacerations to left eye and left lower eyelid, mandible laceration going up to upper lip, and laceration of left lower neck  Plan for Transfer to Critical access hospital for further opthalmologic interventions post washout of left neck    History obtained from chart review  Review of Systems- Unable to be obtained 2/2 intubation     Historical Information   No past medical history on file  Past Surgical History:  No date: FRACTURE SURGERY; Right      Comment:  Arm fracture repair with hardware in place   No current outpatient medications No Known Allergies   Social History     Tobacco Use   • Smoking status: Former     Types: Cigarettes   • Smokeless tobacco: Never   Vaping Use   • Vaping Use: Never used   Substance Use Topics   • Alcohol use: Not Currently   • Drug use: Never    History reviewed  No pertinent family history       Objective                            Vitals I/O      Most Recent Min/Max in 24hrs   Temp 98 °F (36 7 °C) Temp  Min: 97 7 °F (36 5 °C)  Max: 98 °F (36 7 °C)   Pulse 101 Pulse  Min: 77  Max: 102   Resp 16 Resp  Min: 16  Max: 20   /85 BP  Min: 131/85  Max: 148/82   O2 Sat 100 % SpO2  Min: 98 %  Max: 100 %      Intake/Output Summary (Last 24 hours) at 6/22/2023 9378  Last data filed at 6/22/2023 1600  Gross per 24 hour   Intake 3300 ml   Output 1050 ml   Net 2250 ml         Diet NPO     Invasive Monitoring Physical exam   Arterial Line  Josefa /67  Arterial Line BP  Min: 109/62  Max: 124/67   MAP 86 mmHg  Arterial Line MAP (mmHg)  Min: 78 mmHg  Max: 86 mmHg    Physical Exam       Diagnostic Studies    EKG: NSR rate 103  Imaging: CTA as above   I have personally reviewed pertinent reports  Medications:  Scheduled PRN   chlorhexidine, 15 mL, Q12H [address]  tetanus-diphtheria-acellular pertussis, 0 5 mL, Once      midazolam, 2 mg, Q4H PRN       Continuous    fentaNYL, 125 mcg/hr, Last Rate: 125 mcg/hr (06/22/23 1621)  multi-electrolyte, 125 mL/hr, Last Rate: 125 mL/hr (06/22/23 1617)  propofol, 5-70 mcg/kg/min, Last Rate: 70 mcg/kg/min (06/22/23 1604)         Labs:  CBC    Recent Labs     06/22/23  1711   WBC 13 31*   HGB 11 0*   HCT 32 7*      BANDSPCT 3     BMP    Recent Labs     06/22/23  1711   SODIUM 139   K 3 8      CO2 24   AGAP 7   BUN 17   CREATININE 0 73   CALCIUM 7 7*       Coags    Recent Labs     06/22/23  1705   INR 1 12   PTT 24        Additional Electrolytes  No recent results       Blood Gas    Recent Labs     06/22/23  1700   PHART 7 422   GJE4ONQ 35 5*   PO2ART 179 3*   ZMM7RYP 22 6   BEART -1 4     No recent results LFTs  No recent results    Infectious  No recent results  Glucose  Recent Labs     06/22/23  1711   GLUC 135        Critical Care Time Delivered: Upon my evaluation, this patient had a high probability of imminent or life-threatening deterioration due to AHRF, intubated, left eye/face lac, which required my direct attention, intervention, and personal management  I have personally provided 35 minutes of critical care time, exclusive of procedures, teaching, family meetings, and any prior time recorded by providers other than myself     [address] [facility]

## 2023-06-22 NOTE — ASSESSMENT & PLAN NOTE
Penetrating injury to the face and left thigh from    - Clinical evaluation and CT imaging consistent with left eye injury including globe rupture  - Patient with clear and serosanguineous fluid draining from left eye           - Patient without any visual acuity in the left eye on presentation  - OR for neck exploration and interventions as indicated as well as facial wound washout and management  - Airway monitoring           - Patient likely to remain intubated postoperatively for transfer to outside hospital for further evaluation and management of left eye injury   - Local wound care as indicated  - Analgesia as needed  - Update tetanus vaccine status    S/p washout and suturing, continue site exams

## 2023-06-22 NOTE — UTILIZATION REVIEW
Initial Clinical Review    Admission: Date/Time/Statement:   Admission Orders (From admission, onward)     Ordered        06/22/23 1545  Inpatient Admission  Once                      Orders Placed This Encounter   Procedures   • Inpatient Admission     Standing Status:   Standing     Number of Occurrences:   1     Order Specific Question:   Level of Care     Answer:   Critical Care [15]     Order Specific Question:   Bed Type     Answer:   Trauma [7]     Order Specific Question:   Estimated length of stay     Answer:   More than 2 Midnights     Order Specific Question:   Certification     Answer:   I certify that inpatient services are medically necessary for this patient for a duration of greater than two midnights  See H&P and MD Progress Notes for additional information about the patient's course of treatment  ED Arrival Information     Expected   -    Arrival   6/22/2023 11:05    Acuity   Emergent            Means of arrival   -    Escorted by   -    Service   Trauma    Admission type   Emergency            Arrival complaint   -           Chief Complaint   Patient presents with   • Trauma       Initial Presentation: 36 y o  male presents to ED via EMS due to left eye, left face and neck injuries from a   He was reported to have fallen off a roof while power washing In trauma bay patient c/o facial and neck pain, unable to see or open left eye  Exam notes multiple complex lacerations to right anterior lateral, central anterior and left anterior lateral neck without active bleeding  Obvious trauma to the left lower eyelid and left eye including injury to the cornea, iris, abnormal appearing pupil with no visual acuity or sensitivity to light  Clear fluid draining from left eye wound  CT imaging consistent with left eye injury including globe rupture  Patient taken directly to OR from trauma bay for neck exploration and interventions as indicated, left facial wound washout   Admitted as Inpatient critical care trauma service  for penetrating traumatic injury of neck and face Plan  likely to remain intubated post operatively given extensive injury, continue to  monitor airway, analgesia as needed, local wound care as needed, update tetanus status Discussed with Ophthalmology agreeable to transfer for further evaluation and management of left eye injury  Patient will be flown from [place] [address] to [facility]  when bed available     OR 6/22    Procedure(s):  Left - LEFT NECK EXPLORATION  CENTRAL NECK EXPLORATION  CONTROL OF HEMORRHAGE  LIGATION OF BLEEDING ARTERY     General anesthesia    Operative Findings:  -6 cm complex anterior neck laceration overlying thyroid cartilage with exposed trachea, left neck exploration and central neck exploration performed, partial thickness 3 cm laceration to thyroid cartilage but no full-thickness defect, small arterial branch with active hemorrhage in left anterior neck ligated with 3-0 silk suture, 12 French flat HANNAH drain left underneath the strap muscles, strap muscles reapproximated and buttressed over tracheal injury, incision closed in multiple layers with staples in the skin  -2 centimeter anterior neck laceration closed with three 3-0 nylon sutures  -8 cm laceration to left chin extending to lower lip including vermilion border, lip closed with two 3-0 chromic simple interrupted sutures, deep dermal layer closed with interrupted 3-0 Vicryl suture, skin closed with twelve 3-0 nylon sutures  - 1 cm laceration to left upper lip closed with three 3-0 chromic simple interrupted sutures  - 8 5 cm laceration to left cheek closed with ten 3-0 nylon simple interrupted sutures  -1 cm left submandibular laceration closed with one 3-0 nylon simple interrupted sutures         Post operatively pt  remains intubated CMV 16, ,  40% FIO2 , PEEP 6 cm  BP monitoring  Right radial arterial line   Transferred to Intensive care unit on fentanyl and propofol gtts, IV fluids @ 125 ml/hr       ED Triage Vitals   Temperature Pulse Respirations Blood Pressure SpO2   06/22/23 1105 06/22/23 1105 06/22/23 1105 06/22/23 1105 06/22/23 1105   97 7 °F (36 5 °C) 77 20 139/87 98 %      Temp Source Heart Rate Source Patient Position - Orthostatic VS BP Location FiO2 (%)   06/22/23 1105 06/22/23 1105 06/22/23 1130 06/22/23 1130 --   Axillary Monitor Lying Left arm       Pain Score       06/22/23 1552       Med Not Given for Pain - for MAR use only          Wt Readings from Last 1 Encounters:   06/22/23 75 7 kg (166 lb 14 2 oz)     Additional Vital Signs:      Date/Time Temp Pulse Resp BP MAP (mmHg) Arterial Line BP MAP SpO2 FiO2 (%) O2 Device Patient Position - Orthostatic VS   06/22/23 1615 -- 101 16 -- -- 124/67 86 mmHg 100 % -- -- --   06/22/23 1600 98 °F (36 7 °C) 102 16 -- -- 109/62 78 mmHg 99 % -- -- --   06/22/23 1556 -- -- -- -- -- -- -- 100 % 40 Ventilator --   06/22/23 1130 -- 89 20 131/85 103 -- -- 100 % -- None (Room air) Lying   06/22/23 1115 -- 81 20 148/82 -- -- -- 98 % -- -- --       Pertinent Labs/Diagnostic Test Results:   CTA head and neck w wo contrast   Final Result by Lazarus Estelle, MD (06/22 1220)      1  CT brain:  No acute intracranial abnormality  Left orbital trauma with injury to the left globe demonstrating decreased volume and underlying hemorrhage  There is gas surrounding the left globe and within the preseptal and periorbital regions  There    is no retrobulbar hematoma  2  CTA head: Negative for large vessel intracranial occlusion or hemodynamically significant stenosis  3  CTA neck:  Prominent laceration injuries involving the submental region and overlying the thyroid cartilage with associated subcutaneous emphysema  The tracheal contour is preserved with no suspicious injury   There are additional laceration injuries    along the bilateral anterior cervical regions with extensive left greater than right cervical subcutaneous emphysema extending along the sternocleidomastoid muscles, left carotid space and retropharyngeal region  There is no suspicious vascular injury   XR Trauma multiple (SLB/SLRA trauma bay ONLY)   Final Result by Kami Tai MD (06/22 1138)      No acute cardiopulmonary disease within limitations of supine imaging  XR chest 1 view    (Results Pending)   X-ray chest 1 view    (Results Pending)         ED Treatment:   Medication Administration from 06/22/2023 1046 to 06/22/2023 1140       Date/Time Order Dose Route Action Action by Comments     06/22/2023 1123 EDT iohexol (OMNIPAQUE) 350 MG/ML injection (SINGLE-DOSE) 85 mL 85 mL Intravenous Given Malon Staff --        History reviewed  No pertinent past medical history  Present on Admission:  • Laceration of left eye with rupture  • (Resolved) Laceration of face  • (Resolved) Complex laceration of neck  • Penetrating traumatic injury of neck  • Penetrating traumatic injury of face      Admitting Diagnosis: Complex laceration of neck [S11 91XA]  Penetrating facial trauma [S01 83XA]  Age/Sex: 36 y o  male  Admission Orders:  Scheduled Medications:  chlorhexidine, 15 mL, Mouth/Throat, Q12H [facility] HOME  tetanus-diphtheria-acellular pertussis, 0 5 mL, Intramuscular, Once      Continuous IV Infusions:  fentaNYL, 125 mcg/hr, Intravenous, Continuous  multi-electrolyte, 125 mL/hr, Intravenous, Continuous  propofol, 5-70 mcg/kg/min, Intravenous, Titrated      PRN Meds:  midazolam, 2 mg, Intravenous, Q4H PRN        IP CONSULT TO CASE MANAGEMENT    Network Utilization Review Department  ATTENTION: Please call with any questions or concerns to [phone] and carefully listen to the prompts so that you are directed to the right person   All voicemails are confidential   [facility] all requests for admission clinical reviews, approved or denied determinations and any other requests to dedicated fax number below belonging to the campus where the patient is receiving treatment   List of dedicated fax numbers for the Facilities:  [address] [place] [address] DENIALS (Administrative/Medical Necessity) [phone]   [address] [place] [address] (Maternity/NICU/Pediatrics) [phone]   [address] [phone]   [address]hon[address] [phone]   [address] [place] [address] [address] [phone]   [facility] [address] [facility] [phone]

## 2023-06-22 NOTE — ASSESSMENT & PLAN NOTE
- Facial lacerations, present on presentation   - Management of these injuries as outlined under penetrating traumatic injury of the face

## 2023-06-22 NOTE — DISCHARGE SUMMARY
[facility]  Discharge- Jonathan Bonilla 1980, 36 y o  male MRN: [id number]  Unit/Bed#: ICU 13 Encounter: [id number]  Primary Care Provider: No primary care provider on file  Date and time admitted to hospital: 6/22/2023 11:05 AM    Penetrating traumatic injury of face  Assessment & Plan  Penetrating injury to the face and left thigh from    - Clinical evaluation and CT imaging consistent with left eye injury including globe rupture  - Patient with clear and serosanguineous fluid draining from left eye           - Patient without any visual acuity in the left eye on presentation  - OR for neck exploration and interventions as indicated as well as facial wound washout and management  - Airway monitoring           - Patient likely to remain intubated postoperatively for transfer to outside hospital for further evaluation and management of left eye injury   - Local wound care as indicated  - Analgesia as needed  - Update tetanus vaccine status  S/p washout and suturing, continue site exams    Laceration of left eye with rupture  Assessment & Plan  -Laceration of the left eye with globe rupture evident on clinical evaluation as well as CT imaging   - Ophthalmology contacted and will apply eye shield with plan to transfer to [facility] for further work-up and management  - Patient with clear and serosanguineous fluid draining from left eye           - Patient without any visual acuity in the left eye on presentation  Plan to transfer to [facility]  AT [place] for opthalomology and emergent intervention    * Penetrating traumatic injury of neck  Assessment & Plan  -Penetrating injury to the anterior neck and left lateral neck from    - No definitive evidence of vascular, airway or oral digestive track during initial trauma evaluation and CT imaging  - OR for neck exploration and interventions as indicated    - Airway monitoring           - Patient likely to remain intubated postoperatively for transfer to outside hospital for further evaluation and management of left eye injury   - Local wound care as indicated  - Analgesia as needed  - Update tetanus vaccine status  S/p surgery with washout, HANNAH drain in place  Follow up drainage            Medical Problems     Resolved Problems  Date Reviewed: 6/22/2023          Resolved    Left eye injury 6/22/2023     Resolved by  [address]    Laceration of face 6/22/2023     Resolved by  Aurelia Code, CRNP    Complex laceration of neck 6/22/2023     Resolved by  Aurelia Code, [address]          Admission Date:   Admission Orders (From admission, onward)     Ordered        06/22/23 1545  Inpatient Admission  Once                        Admitting Diagnosis: Complex laceration of neck [S11 91XA]  Penetrating facial trauma [S01 83XA]    HPI per admission H and P: Sasha Nassar is a 36 y o  male who presents with left eye, face, and neck injuries from a   He was reported to have potentially fallen off of a roof while power washing and suffered injuries to his face and neck per EMS  They were quickly bandaged by others on scene prior to EMS arrival with no airway or bleeding concerns during transport per EMS  In the trauma bay, the patient complained of facial and neck pain, but denied any shortness of breath, difficulty breathing or swallowing  He did note left eye pain as well and was unable to see or open his left eye during his initial evaluation  Procedures Performed:   Orders Placed This Encounter   Procedures   • Fast Ultrasound     Summary of Hospital Course: Admitted as a level A trauma, went stat to OR for left eye injury, facial lac, neck wound given concern for carotid sheath involvement  No vascular insult appreciated   Returned to ICU with plan to transfer to [facility]  AT [place] for blown left globe and likely need for surgical intervention    Significant Findings, Care, Treatment and Services Provided: Blown left globe, facial lacs x2    Complications: N/a    Condition at Discharge: stable       Discharge instructions/Information to patient and family:   See after visit summary for information provided to patient and family  Provisions for Follow-Up Care:  See after visit summary for information related to follow-up care and any pertinent home health orders  PCP: No primary care provider on file  Disposition: Transfer to [address] [place] [address] for Opthamology    Planned Readmission: No    Discharge Statement   I spent 15 minutes discharging the patient  This time was spent on the day of discharge  I had direct contact with the patient on the day of discharge  Additional documentation is required if more than 30 minutes were spent on discharge  Discharge Medications:  See after visit summary for reconciled discharge medications provided to patient and family

## 2023-06-22 NOTE — ASSESSMENT & PLAN NOTE
-Laceration of the left eye with globe rupture evident on clinical evaluation as well as CT imaging   - Ophthalmology contacted and will apply eye shield with plan to transfer to [facility] for further work-up and management  - Patient with clear and serosanguineous fluid draining from left eye           - Patient without any visual acuity in the left eye on presentation    Plan to transfer to [facility]  AT [place] for opthalomology and emergent intervention

## 2023-06-22 NOTE — RESPIRATORY THERAPY NOTE
06/22/23 1556   Respiratory Assessment   Assessment Type Assess only   General Appearance Sedated   Respiratory Pattern Normal   Chest Assessment Chest expansion symmetrical   Bilateral Breath Sounds Diminished   Resp Comments Recieved patient from OR intubated 8 0 24@ the lip  Patient placed on vent on documented settings  O2 Device Vent   Vent Information   Vent    Vent type Mclaughlin G5   Mclaughlin C3/G5 Vent Mode (S)CMV   $ Vent Charge-INITIAL Yes   $ Vital Capacity Mech/Peak Flow Yes   $ Pulse Oximetry Spot Check Charge Completed   SpO2 100 %   (S)CMV Settings   Resp Rate (BPM) 16 BPM   VT (mL) 450 mL   FIO2 (%) 40 %   PEEP (cmH2O) 6 cmH2O   Insp Time (%) 1 %   Flow Trigger (LPM) 3   Humidification Heater   Heater Temperature (Set) 98 6 °F (37 °C)   Pause Time (%) 0 %   (S)CMV Actuals   Resp Rate (BPM) 16 BPM   VT (mL) 449   MV 7 3   MAP (cmH2O) 7 9 cmH2O   Peak Pressure (cmH2O) 14 cmH2O   I:E Ratio (Obs) 1:2 7   Insp Resistance 7   Heater Temperature (Obs) 98 6 °F (37 °C)   (S)CMV Alarms   High Peak Pressure (cmH2O) 40   Low Pressure (cmH2O) 8 cm H2O   High Resp Rate (BPM) 40 BPM   Low Resp Rate (BPM) 8 BPM   High MV (L/min) 20 L/min   Low MV (L/min) 4 L/min   High VT (mL) 1000 mL   Low VT (mL) 250 mL   Apnea Time (s) 20 S   Maintenance   Alarm (pink) cable attached Yes   Resuscitation bag with peep valve at bedside Yes   Water bag changed Yes   Circuit changed Yes   Daily Screen   Patient safety screen outcome: Failed  (paitent just intubated)   Not Ready for Weaning due to: Going on Transport intubated   IHI Ventilator Associated Pneumonia Bundle   Head of Bed Elevated HOB less than 20   ETT  Oral;Straight; Inflated; Hi-Lo; Cuffed;Pre-curved 8 mm   Placement Date/Time: 06/22/23 1147   Mask Ventilation: Mask ventilation not attempted (0)  Preoxygenated: Yes  Technique: Rapid sequence;Video laryngoscopy;Stylet  Type: Oral;Straight; Inflated; Hi-Lo; Cuffed;Pre-curved  Tube Size: 8 mm  Laryngoscope: Hans  Secured at (cm) 24   Measured from [facility] by Commercial tube [employer]ado   Site Condition Redness   Cuff Pressure (cm H2O)   (green mlt)   HI-LO Suction  Intermittent suction   HI-LO Secretions Scant   HI-LO Intervention Patent

## 2023-06-22 NOTE — RESPIRATORY THERAPY NOTE
06/22/23 1556   Respiratory Protocol   Protocol Initiated? Yes   Protocol Selection Respiratory   Language Barrier? No   Medical & Social History Reviewed? Yes   Diagnostic Studies Reviewed? Yes   Physical Assessment Performed? Yes   Home Devices/Therapy Other (Comment)   Respiratory Plan Vent/NIV/HFNC   Respiratory Assessment   Assessment Type Assess only   General Appearance Sedated   Respiratory Pattern Normal   Chest Assessment Chest expansion symmetrical   Bilateral Breath Sounds Diminished   Resp Comments Recieved patient from OR intubated 8 0 24@ the lip  Patient placed on vent on documented settings  O2 Device Vent   ETT  Oral;Straight; Inflated; Hi-Lo; Cuffed;Pre-curved 8 mm   Placement Date/Time: 06/22/23 1147   Mask Ventilation: Mask ventilation not attempted (0)  Preoxygenated: Yes  Technique: Rapid sequence;Video laryngoscopy;Stylet  Type: Oral;Straight; Inflated; Hi-Lo; Cuffed;Pre-curved  Tube Size: 8 mm  Laryngoscope: Hans     Secured at (cm) 24   Measured from [facility] by [employer]   Site Condition Redness   Cuff Pressure (cm H2O)   (green mlt)   HI-LO Suction  Intermittent suction   HI-LO Secretions Scant   HI-LO Intervention Patent

## 2023-06-22 NOTE — ASSESSMENT & PLAN NOTE
-Penetrating injury to the anterior neck and left lateral neck from    - No definitive evidence of vascular, airway or oral digestive track during initial trauma evaluation and CT imaging  - OR for neck exploration and interventions as indicated  - Airway monitoring           - Patient likely to remain intubated postoperatively for transfer to outside hospital for further evaluation and management of left eye injury   - Local wound care as indicated  - Analgesia as needed  - Update tetanus vaccine status  S/p surgery with washout, HANNAH drain in place   Follow up drainage

## 2023-06-22 NOTE — DISCHARGE INSTR - AVS FIRST PAGE
Going to [facility] [place] [facility]\""  AT [place] 2/2 eye injury

## 2023-06-22 NOTE — OP NOTE
OPERATIVE REPORT  PATIENT NAME: Tru Vicente    :  1980  MRN: [id number]  Pt Location: AN OR ROOM 02    SURGERY DATE: 2023    Surgeon(s) and Role:     * Weston Durant, DO - Primary     * Jesika Sexton MD - Assisting    Preop Diagnosis:  Complex laceration of neck [S11 91XA]    Post-Op Diagnosis Codes:     * Complex laceration of neck [S11 91XA]    Procedure(s):  Left - LEFT NECK EXPLORATION  CENTRAL NECK EXPLORATION  CONTROL OF HEMORRHAGE  LIGATION OF BLEEDING ARTERY    Specimen(s):  * No specimens in log *    Estimated Blood Loss:   200 mL    Drains:  Closed/Suction Drain Right Neck Bulb (Active)   Number of days: 0       Urethral Catheter Temperature probe;Non-latex; Double-lumen 16 Fr   (Active)   Number of days: 0       Anesthesia Type:   General    Operative Indications:  Complex laceration of neck [S11 91XA]    Operative Findings:  -6 cm complex anterior neck laceration overlying thyroid cartilage with exposed trachea, left neck exploration and central neck exploration performed, partial thickness 3 cm laceration to thyroid cartilage but no full-thickness defect, small arterial branch with active hemorrhage in left anterior neck ligated with 3-0 silk suture, 12 [place] flat HANNAH drain left underneath the strap muscles, strap muscles reapproximated and buttressed over tracheal injury, incision closed in multiple layers with staples in the skin  -2 centimeter anterior neck laceration closed with three 3-0 nylon sutures  -8 cm laceration to left chin extending to lower lip including vermilion border, lip closed with two 3-0 chromic simple interrupted sutures, deep dermal layer closed with interrupted 3-0 Vicryl suture, skin closed with twelve 3-0 nylon sutures  - 1 cm laceration to left upper lip closed with three 3-0 chromic simple interrupted sutures  - 8 5 cm laceration to left cheek closed with ten 3-0 nylon simple interrupted sutures  -1 cm left submandibular laceration closed with one 3-0 nylon simple interrupted sutures    Complications:   None    Procedure and Technique:  Patient was brought into the operating room and identity and procedure were confirmed  Patient was placed supine on the operating room table and general anesthesia was induced  Patient was intubated by anesthesia  Bonner catheter was placed  The neck and left face were prepped with Betadine and draped in the usual sterile fashion  Timeout was performed and all parties were in agreement  Neck was examined and multiple lacerations were noted on the face and anterior neck  There were was a complex 6 cm laceration noted to the anterior neck overlying the thyroid cartilage with exposed thyroid cartilage  This laceration was extended laterally onto the left neck approximately 10 cm as well as 3 cm to the right allowing for exploration of the left and central neck  Platysma was divided and strap muscles were mobilized from surrounding tissue  Active hemorrhage was noted from a small arterial branch in the left anterior neck  This was ligated with 3-0 silk suture  Exploration was continued down to the carotid sheath which was noted to not have been violated so this was not opened  At this point attention was turned to the central neck where a 3 cm laceration was noted to the inferior border of the thyroid cartilage  This was partial-thickness and no full-thickness defect was noted  At this point this incision was copiously irrigated with normal saline and hemostasis was ensured using Bovie electrocautery  A 12 [place] flat HANNAH drain was left underneath the strap muscles  The strap muscles were then reapproximated and buttressed over the injury to the thyroid cartilage using 0 Vicryl suture  Platysma was closed using interrupted 3-0 Vicryl suture  Deep dermal layer was closed in an interrupted fashion using 3-0 Vicryl's suture  The skin was then stapled closed  Attention was then turned to the remaining lacerations  These were all copiously irrigated with normal saline  An 8 5 cm laceration was noted to the left cheek which was closed using 10 interrupted 3-0 nylon sutures  And 8 cm laceration was noted extending up the left chin into the lower lip crossing the vermilion border  The lip was closed using 2 interrupted 3-0 chromic sutures and the remainder of the laceration was closed using twelve 3-0 nylon sutures in an interrupted fashion  A 2 cm laceration was noted to the anterior neck to the right of midline which was closed using three 3-0 nylon sutures in an interrupted fashion  A 1 cm laceration was noted to the left upper lip  This was closed using 3 interrupted 3-0 chromic sutures  Lastly a 1 cm laceration was noted to the left submandibular area which was closed using a single 3-0 nylon interrupted suture  All lacerations were washed and dried  Bacitracin ointment was applied to all lacerations  The surgical neck incision was then dressed with an ABD secured with tape  The patient was then returned to the ICU intubated having tolerated the procedure well  Dr Sreedhar Roa was present for the entire procedure      Patient Disposition:  Critical Care Unit        SIGNATURE: Brendan Grubbs MD  DATE: June 22, 2023  TIME: 3:46 PM

## 2023-06-22 NOTE — ASSESSMENT & PLAN NOTE
- Penetrating injury to the face and left thigh from    - Clinical evaluation and CT imaging consistent with left eye injury including globe rupture  - Patient with clear and serosanguineous fluid draining from left eye    - Patient without any visual acuity in the left eye on presentation  - OR for neck exploration and interventions as indicated as well as facial wound washout and management  - Airway monitoring    - Patient likely to remain intubated postoperatively for transfer to outside hospital for further evaluation and management of left eye injury   - Local wound care as indicated  - Analgesia as needed  - Update tetanus vaccine status

## 2023-06-22 NOTE — ANESTHESIA PREPROCEDURE EVALUATION
Procedure:  EXPLORATION NECK (Left: Neck)    36year old who fell off of a roof and was hit on the left side of his face with a  - concern for left globe injury  Relevant Problems   No relevant active problems        Physical Exam    Airway  Comment: Blood in mouth - large laceration in neck  Elected to not have patient demonstrate ROM given location of soft tissue injury and constant slow stream of blood running down neck    TM Distance: >3 FB  Neck ROM: full     Dental       Cardiovascular      Pulmonary      Other Findings  Large left-sided neck laceration      Anesthesia Plan  ASA Score- 1 Emergent    Anesthesia Type- general with ASA Monitors  Additional Monitors:   Airway Plan: ETT  Comment: RSI for presumed full stomach    Patient educated on possibility of leaving ET tube in place given the extent of his injuries  Consent implied given emergency nature of the procedure  Plan Factors-Exercise tolerance (METS): >4 METS  Chart reviewed  Patient summary reviewed  Induction- intravenous  Postoperative Plan-     Informed Consent- Anesthetic plan and risks discussed with patient (abbreviated verbal consent)  I personally reviewed this patient with the CRNA  Discussed and agreed on the Anesthesia Plan with the CRNA  Reyes Curt

## 2023-06-22 NOTE — ED PROVIDER NOTES
Emergency Department Airway Evaluation and Management Form    History  Obtained from: pt himself and paramedics  Patient has no allergy information on record  Chief Complaint   Patient presents with   • Trauma     HPI industrial  injury to left side of face, left orbit/eye, left neck and central neck over manubrium    No past medical history on file  No past surgical history on file  No family history on file  I have reviewed and agree with the history as documented  Review of Systems    Physical Exam  /87   Pulse 77   Temp 97 7 °F (36 5 °C) (Axillary)   Resp 20   Wt 75 7 kg (166 lb 14 2 oz)   SpO2 98%     Physical Exam Airway intact clear bilateral breath sounds  Large laceration to the neck    ED Medications  Medications - No data to display    Intubation  Procedures    Notes  No acute airway intervention needed  , remainder of care per primary trauma team        Final Diagnosis  Final diagnoses:   Complex laceration of neck   Penetrating facial trauma       ED Provider  Electronically Signed by     Aly Burr DO  06/22/23 1129

## 2023-06-22 NOTE — EMTALA/ACUTE CARE TRANSFER
[address] [address]  Dept: [phone]      ACUTE CARE TRANSFER CONSENT    NAME Gerson Ferreira                                         1980                              MRN [id number]    I have been informed of my rights regarding examination, treatment, and transfer   by Dr Varinder Tai att  providers found    Benefits: Specialized equipment and/or services available at the receiving facility (Include comment)________________________ (Specialty management of eye injury not avaialable at this facility )    Risks: Potential for delay in receiving treatment, Potential deterioration of medical condition, Loss of IV, Increased discomfort during transfer, Possible worsening of condition or death during transfer      Consent for Transfer:  I acknowledge that my medical condition has been evaluated and explained to me by the treating physician or other qualified medical person and/or my attending physician, who has recommended that I be transferred to the service of  Accepting Physician: Dr Colton Wilson at [address], [address] : [facility]  The above potential benefits of such transfer, the potential risks associated with such transfer, and the probable risks of not being transferred have been explained to me, and I fully understand them  The doctor has explained that, in my case, the benefits of transfer outweigh the risks  I agree to be transferred  I authorize the performance of emergency medical procedures and treatments upon me in both transit and upon arrival at the receiving facility  Additionally, I authorize the release of any and all medical records to the receiving facility and request they be transported with me, if possible  I understand that the safest mode of transportation during a medical emergency is an ambulance and that the Hospital advocates the use of this mode of transport  Risks of traveling to the receiving facility by car, including absence of medical control, life sustaining equipment, such as oxygen, and medical personnel has been explained to me and I fully understand them  (NEEMA CORRECT BOX BELOW)  [  ]  I consent to the stated transfer and to be transported by ambulance/helicopter  [  ]  I consent to the stated transfer, but refuse transportation by ambulance and accept full responsibility for my transportation by car  I understand the risks of non-ambulance transfers and I exonerate the Hospital and its staff from any deterioration in my condition that results from this refusal     X___________________________________________    DATE  23  TIME________  Signature of patient or legally responsible individual signing on patient behalf           RELATIONSHIP TO PATIENT_________________________          Provider Certification    NAME Sasha Nassar                                         1980                              MRN [id number]    A medical screening exam was performed on the above named patient    Based on the examination:    Condition Necessitating Transfer: Left eye injury including globe rupture    Patient Condition: The patient has been stabilized such that within reasonable medical probability, no material deterioration of the patient condition or the condition of the unborn child(caio) is likely to result from the transfer    Reason for Transfer: Other (Include comment)____________________ (Specialty management of eye injury not avaialable at this facility )    Transfer Requirements: [address] [facility]   · Space available and qualified personnel available for treatment as acknowledged by    · Agreed to accept transfer and to provide appropriate medical treatment as acknowledged by       Dr Jaden Jalloh  · Appropriate medical records of the examination and treatment of the patient are provided at the time of transfer STAFF INITIAL WHEN COMPLETED _______  · Transfer will be performed by qualified personnel from    and appropriate transfer equipment as required, including the use of necessary and appropriate life support measures  Provider Certification: I have examined the patient and explained the following risks and benefits of being transferred/refusing transfer to the patient/family:  General risk, such as traffic hazards, adverse weather conditions, rough terrain or turbulence, possible failure of equipment (including vehicle or aircraft), or consequences of actions of persons outside the control of the transport personnel, Unanticipated needs of medical equipment and personnel during transport, Risk of worsening condition, The possibility of a transport vehicle being unavailable      Based on these reasonable risks and benefits to the patient and/or the unborn child(caio), and based upon the information available at the time of the patient’s examination, I certify that the medical benefits reasonably to be expected from the provision of appropriate medical treatments at another medical facility outweigh the increasing risks, if any, to the individual’s medical condition, and in the case of labor to the unborn child, from effecting the transfer      X____________________________________________ DATE 06/22/23        TIME_______      ORIGINAL - SEND TO MEDICAL RECORDS   COPY - SEND WITH PATIENT DURING TRANSFER

## 2023-06-22 NOTE — ASSESSMENT & PLAN NOTE
- Complex neck laceration from  injury   - Management as noted under penetrating traumatic neck injury

## 2023-06-23 LAB
BASE EXCESS BLDA CALC-SCNC: -3 MMOL/L (ref -2–3)
BASE EXCESS BLDA CALC-SCNC: 0 MMOL/L (ref -2–3)
CA-I BLD-SCNC: 1.08 MMOL/L (ref 1.12–1.32)
CA-I BLD-SCNC: 1.13 MMOL/L (ref 1.12–1.32)
GLUCOSE SERPL-MCNC: 102 MG/DL (ref 65–140)
GLUCOSE SERPL-MCNC: 141 MG/DL (ref 65–140)
HCO3 BLDA-SCNC: 22.1 MMOL/L (ref 22–28)
HCO3 BLDA-SCNC: 22.1 MMOL/L (ref 24–30)
HCT VFR BLD CALC: 32 % (ref 36.5–49.3)
HCT VFR BLD CALC: 40 % (ref 36.5–49.3)
HGB BLDA-MCNC: 10.9 G/DL (ref 12–17)
HGB BLDA-MCNC: 13.6 G/DL (ref 12–17)
PCO2 BLD: 23 MMOL/L (ref 21–32)
PCO2 BLD: 23 MMOL/L (ref 21–32)
PCO2 BLD: 29.3 MM HG (ref 42–50)
PCO2 BLD: 37.9 MM HG (ref 36–44)
PH BLD: 7.37 [PH] (ref 7.35–7.45)
PH BLD: 7.49 [PH] (ref 7.3–7.4)
PO2 BLD: 35 MM HG (ref 35–45)
PO2 BLD: 90 MM HG (ref 75–129)
POTASSIUM BLD-SCNC: 2.9 MMOL/L (ref 3.5–5.3)
POTASSIUM BLD-SCNC: 3.5 MMOL/L (ref 3.5–5.3)
SAO2 % BLD FROM PO2: 74 % (ref 60–85)
SAO2 % BLD FROM PO2: 97 % (ref 60–85)
SODIUM BLD-SCNC: 142 MMOL/L (ref 136–145)
SODIUM BLD-SCNC: 142 MMOL/L (ref 136–145)
SPECIMEN SOURCE: ABNORMAL
SPECIMEN SOURCE: ABNORMAL

## 2023-07-03 NOTE — ANESTHESIA POSTPROCEDURE EVALUATION
Post-Op Assessment Note    CV Status:  Stable  Pain Score: 0    Pain management: adequate     Mental Status:  Somnolent   Hydration Status:  Stable   PONV Controlled:  None   Airway Patency:  Patent  Airway: intubated      Post Op Vitals Reviewed: Yes      Staff: Anesthesiologist   Reason for prolonged intubation > 24 hours:  Airway protection and transfer to outside facilityReason for prolonged intubation > 48 hours:  Airway protection      No notable events documented.     BP      Temp      Pulse     Resp      SpO2        Please review nursing notes for most updated vitals signs

## 2023-09-19 ENCOUNTER — NEW REFERRAL (OUTPATIENT)
Dept: URBAN - METROPOLITAN AREA CLINIC 46 | Facility: CLINIC | Age: 43
End: 2023-09-19

## 2023-09-19 DIAGNOSIS — H44.002: ICD-10-CM

## 2023-09-19 DIAGNOSIS — H16.002: ICD-10-CM

## 2023-09-19 DIAGNOSIS — H33.002: ICD-10-CM

## 2023-09-19 DIAGNOSIS — S05.92XA: ICD-10-CM

## 2023-09-19 PROCEDURE — 76512 OPH US DX B-SCAN: CPT

## 2023-09-19 PROCEDURE — 99204 OFFICE O/P NEW MOD 45 MIN: CPT

## 2023-09-19 PROCEDURE — 67028 INJECTION EYE DRUG: CPT

## 2023-09-19 PROCEDURE — 92134 CPTRZ OPH DX IMG PST SGM RTA: CPT

## 2023-09-19 PROCEDURE — 67015 RELEASE OF EYE FLUID: CPT

## 2023-09-19 ASSESSMENT — VISUAL ACUITY: OD_SC: 20/20

## 2023-09-19 ASSESSMENT — TONOMETRY
OS_IOP_MMHG: 3
OD_IOP_MMHG: 20

## 2023-10-17 ENCOUNTER — FOLLOW UP (OUTPATIENT)
Dept: URBAN - METROPOLITAN AREA CLINIC 46 | Facility: CLINIC | Age: 43
End: 2023-10-17

## 2023-10-17 DIAGNOSIS — H16.002: ICD-10-CM

## 2023-10-17 DIAGNOSIS — H44.002: ICD-10-CM

## 2023-10-17 PROCEDURE — 92012 INTRM OPH EXAM EST PATIENT: CPT

## 2023-10-17 PROCEDURE — 92202 OPSCPY EXTND ON/MAC DRAW: CPT

## 2023-10-17 PROCEDURE — 76512 OPH US DX B-SCAN: CPT

## 2023-10-17 PROCEDURE — 92134 CPTRZ OPH DX IMG PST SGM RTA: CPT | Mod: NC

## 2023-10-17 ASSESSMENT — TONOMETRY
OS_IOP_MMHG: 2
OD_IOP_MMHG: 15

## 2023-10-17 ASSESSMENT — VISUAL ACUITY: OD_SC: 20/20

## (undated) DEVICE — DRESSING QUIKCLOT CONTROL PLUS 3IN X 2YD ZFOLD

## (undated) DEVICE — ABDOMINAL PAD: Brand: DERMACEA

## (undated) DEVICE — BETHLEHEM TRAUMA PACK: Brand: CARDINAL HEALTH